# Patient Record
Sex: FEMALE | Race: WHITE
[De-identification: names, ages, dates, MRNs, and addresses within clinical notes are randomized per-mention and may not be internally consistent; named-entity substitution may affect disease eponyms.]

---

## 2017-04-21 ENCOUNTER — HOSPITAL ENCOUNTER (OUTPATIENT)
Dept: HOSPITAL 17 - NEPC | Age: 77
Setting detail: OBSERVATION
LOS: 1 days | Discharge: HOME | End: 2017-04-22
Attending: HOSPITALIST | Admitting: HOSPITALIST
Payer: COMMERCIAL

## 2017-04-21 VITALS — WEIGHT: 158.73 LBS | HEIGHT: 65 IN | BODY MASS INDEX: 26.45 KG/M2

## 2017-04-21 VITALS
SYSTOLIC BLOOD PRESSURE: 113 MMHG | TEMPERATURE: 99.9 F | DIASTOLIC BLOOD PRESSURE: 57 MMHG | HEART RATE: 114 BPM | OXYGEN SATURATION: 100 % | RESPIRATION RATE: 16 BRPM

## 2017-04-21 VITALS — TEMPERATURE: 99.5 F | DIASTOLIC BLOOD PRESSURE: 64 MMHG | SYSTOLIC BLOOD PRESSURE: 110 MMHG

## 2017-04-21 VITALS
RESPIRATION RATE: 16 BRPM | HEART RATE: 119 BPM | TEMPERATURE: 99.5 F | DIASTOLIC BLOOD PRESSURE: 56 MMHG | OXYGEN SATURATION: 100 % | SYSTOLIC BLOOD PRESSURE: 110 MMHG

## 2017-04-21 VITALS
RESPIRATION RATE: 16 BRPM | SYSTOLIC BLOOD PRESSURE: 113 MMHG | TEMPERATURE: 98.2 F | OXYGEN SATURATION: 99 % | HEART RATE: 117 BPM | DIASTOLIC BLOOD PRESSURE: 68 MMHG

## 2017-04-21 VITALS
OXYGEN SATURATION: 99 % | DIASTOLIC BLOOD PRESSURE: 67 MMHG | HEART RATE: 106 BPM | SYSTOLIC BLOOD PRESSURE: 146 MMHG | TEMPERATURE: 99.4 F | RESPIRATION RATE: 16 BRPM

## 2017-04-21 VITALS — OXYGEN SATURATION: 97 %

## 2017-04-21 DIAGNOSIS — T45.1X5A: ICD-10-CM

## 2017-04-21 DIAGNOSIS — E78.00: ICD-10-CM

## 2017-04-21 DIAGNOSIS — I10: ICD-10-CM

## 2017-04-21 DIAGNOSIS — D64.9: ICD-10-CM

## 2017-04-21 DIAGNOSIS — E78.5: ICD-10-CM

## 2017-04-21 DIAGNOSIS — R00.0: ICD-10-CM

## 2017-04-21 DIAGNOSIS — D72.829: Primary | ICD-10-CM

## 2017-04-21 DIAGNOSIS — T38.0X5A: ICD-10-CM

## 2017-04-21 DIAGNOSIS — C56.9: ICD-10-CM

## 2017-04-21 DIAGNOSIS — Z90.710: ICD-10-CM

## 2017-04-21 DIAGNOSIS — D69.6: ICD-10-CM

## 2017-04-21 DIAGNOSIS — Z79.899: ICD-10-CM

## 2017-04-21 LAB
ANION GAP SERPL CALC-SCNC: 9 MEQ/L (ref 5–15)
APTT BLD: 26.6 SEC (ref 24.3–30.1)
BACTERIA #/AREA URNS HPF: (no result) /HPF
BASOPHILS # BLD AUTO: 0 TH/MM3 (ref 0–0.2)
BASOPHILS NFR BLD: 0.1 % (ref 0–2)
BUN SERPL-MCNC: 19 MG/DL (ref 7–18)
CHLORIDE SERPL-SCNC: 102 MEQ/L (ref 98–107)
CK SERPL-CCNC: 60 U/L (ref 26–192)
COLOR UR: (no result)
COMMENT (UR): (no result)
CULTURE IF INDICATED: (no result)
DACRYOCYTES BLD QL SMEAR: (no result)
EOSINOPHIL # BLD: 0 TH/MM3 (ref 0–0.4)
EOSINOPHIL NFR BLD: 0 % (ref 0–4)
ERYTHROCYTE [DISTWIDTH] IN BLOOD BY AUTOMATED COUNT: 18.6 % (ref 11.6–17.2)
FERRITIN SERPL-MCNC: 438 NG/ML (ref 8–252)
GFR SERPLBLD BASED ON 1.73 SQ M-ARVRAT: 67 ML/MIN (ref 89–?)
GLUCOSE UR STRIP-MCNC: (no result) MG/DL
HCO3 BLD-SCNC: 26.4 MEQ/L (ref 21–32)
HCT VFR BLD CALC: 23.7 % (ref 35–46)
HEMO FLAGS: (no result)
HGB UR QL STRIP: (no result)
INR PPP: 1 RATIO
KETONES UR STRIP-MCNC: (no result) MG/DL
LYMPHOCYTES # BLD AUTO: 1.2 TH/MM3 (ref 1–4.8)
LYMPHOCYTES NFR BLD AUTO: 7.1 % (ref 9–44)
MCH RBC QN AUTO: 34.7 PG (ref 27–34)
MCHC RBC AUTO-ENTMCNC: 34.7 % (ref 32–36)
MCV RBC AUTO: 100 FL (ref 80–100)
MONOCYTES NFR BLD: 3 % (ref 0–8)
MUCOUS THREADS #/AREA URNS LPF: (no result) /LPF
NEUTROPHILS # BLD AUTO: 14.8 TH/MM3 (ref 1.8–7.7)
NEUTROPHILS NFR BLD AUTO: 89.8 % (ref 16–70)
NITRITE UR QL STRIP: (no result)
PLAT MORPH BLD: NORMAL
PLATELET # BLD: 40 TH/MM3 (ref 150–450)
PLATELET BLD QL SMEAR: (no result)
POTASSIUM SERPL-SCNC: 3.9 MEQ/L (ref 3.5–5.1)
PROTHROMBIN TIME: 11.2 SEC (ref 9.8–11.6)
RBC # BLD AUTO: 2.37 MIL/MM3 (ref 4–5.3)
SCAN/DIFF: (no result)
SODIUM SERPL-SCNC: 137 MEQ/L (ref 136–145)
SP GR UR STRIP: 1.01 (ref 1–1.03)
SQUAMOUS #/AREA URNS HPF: 1 /HPF (ref 0–5)
TRANSFERRIN IRON PROFILE: 201 MG/DL (ref 200–360)
WBC # BLD AUTO: 16.5 TH/MM3 (ref 4–11)

## 2017-04-21 PROCEDURE — 97161 PT EVAL LOW COMPLEX 20 MIN: CPT

## 2017-04-21 PROCEDURE — 85610 PROTHROMBIN TIME: CPT

## 2017-04-21 PROCEDURE — 85014 HEMATOCRIT: CPT

## 2017-04-21 PROCEDURE — 84443 ASSAY THYROID STIM HORMONE: CPT

## 2017-04-21 PROCEDURE — 36430 TRANSFUSION BLD/BLD COMPNT: CPT

## 2017-04-21 PROCEDURE — 83550 IRON BINDING TEST: CPT

## 2017-04-21 PROCEDURE — G0378 HOSPITAL OBSERVATION PER HR: HCPCS

## 2017-04-21 PROCEDURE — 82728 ASSAY OF FERRITIN: CPT

## 2017-04-21 PROCEDURE — 83540 ASSAY OF IRON: CPT

## 2017-04-21 PROCEDURE — 96360 HYDRATION IV INFUSION INIT: CPT

## 2017-04-21 PROCEDURE — P9035 PLATELET PHERES LEUKOREDUCED: HCPCS

## 2017-04-21 PROCEDURE — 85730 THROMBOPLASTIN TIME PARTIAL: CPT

## 2017-04-21 PROCEDURE — 80048 BASIC METABOLIC PNL TOTAL CA: CPT

## 2017-04-21 PROCEDURE — 87040 BLOOD CULTURE FOR BACTERIA: CPT

## 2017-04-21 PROCEDURE — 93005 ELECTROCARDIOGRAM TRACING: CPT

## 2017-04-21 PROCEDURE — 83605 ASSAY OF LACTIC ACID: CPT

## 2017-04-21 PROCEDURE — 86901 BLOOD TYPING SEROLOGIC RH(D): CPT

## 2017-04-21 PROCEDURE — 86900 BLOOD TYPING SEROLOGIC ABO: CPT

## 2017-04-21 PROCEDURE — 85007 BL SMEAR W/DIFF WBC COUNT: CPT

## 2017-04-21 PROCEDURE — 99285 EMERGENCY DEPT VISIT HI MDM: CPT

## 2017-04-21 PROCEDURE — 71010: CPT

## 2017-04-21 PROCEDURE — 84484 ASSAY OF TROPONIN QUANT: CPT

## 2017-04-21 PROCEDURE — P9016 RBC LEUKOCYTES REDUCED: HCPCS

## 2017-04-21 PROCEDURE — 85025 COMPLETE CBC W/AUTO DIFF WBC: CPT

## 2017-04-21 PROCEDURE — 85018 HEMOGLOBIN: CPT

## 2017-04-21 PROCEDURE — 85027 COMPLETE CBC AUTOMATED: CPT

## 2017-04-21 PROCEDURE — 86920 COMPATIBILITY TEST SPIN: CPT

## 2017-04-21 PROCEDURE — 86850 RBC ANTIBODY SCREEN: CPT

## 2017-04-21 PROCEDURE — 82550 ASSAY OF CK (CPK): CPT

## 2017-04-21 PROCEDURE — 83880 ASSAY OF NATRIURETIC PEPTIDE: CPT

## 2017-04-21 PROCEDURE — 81001 URINALYSIS AUTO W/SCOPE: CPT

## 2017-04-21 RX ADMIN — PHENYTOIN SODIUM SCH MLS/HR: 50 INJECTION INTRAMUSCULAR; INTRAVENOUS at 20:42

## 2017-04-21 RX ADMIN — Medication SCH ML: at 21:10

## 2017-04-21 NOTE — PD
HPI


Chief Complaint:  Abnormal Results


Time Seen by Provider:  14:14


Travel History


International Travel<30 days:  No


Contact w/Intl Traveler<30days:  No


Traveled to known affect area:  No





History of Present Illness


HPI


76-year-old female presents with general weakness and tachycardia from Dr. schultz office.  She states she feels worse when she moves around.  They told 

her she had a low hemoglobin and she was needing transfusion.  She states this 

was not available to be done as an outpatient and given her symptoms it was not 

thought that she could make it through the weekend without getting a 

transfusion so they advised her to come here.  She denies any active bleeding.  

She states she follows with Dr. schultz for ovarian cancer and is on her sixth 

round of chemotherapy.  She denies any fever or other concurrent complaints as 

well.  Quality is lightheaded.  Severity is moderate.  Her last chemotherapy 

was about a couple weeks ago





PFSH


Past Medical History


Cancer:  No


Cardiovascular Problems:  No


High Cholesterol:  Yes


Chemotherapy:  Yes (4/7/14)


Diabetes:  No


Endocrine:  No


Genitourinary:  No


Hepatitis:  No


Hiatal Hernia:  No


Hypertension:  Yes (NO MEDS)


Immune Disorder:  No


Musculoskeletal:  No


Neurologic:  No


Psychiatric:  No


Reproductive:  No


Respiratory:  No


Immunizations Current:  Yes


Thyroid Disease:  No





Past Surgical History


Abdominal Surgery:  Yes (APPENDECTOMY)


AICD:  No


Appendectomy:  Yes (60 YRS AGO)


Cardiac Surgery:  No


Ear Surgery:  No


Endocrine Surgery:  No


Eye Surgery:  No


Genitourinary Surgery:  No


Gynecologic Surgery:  No


Joint Replacement:  No


Oral Surgery:  No


Pacemaker:  No


Thoracic Surgery:  No


Tonsillectomy:  Yes (60 YEARS AGO)





Social History


Alcohol Use:  Yes (DRINKS DAILY)


Tobacco Use:  No


Substance Use:  No





Allergies-Medications


(Allergen,Severity, Reaction):  


Coded Allergies:  


     No Known Allergies (Verified , 11/10/16)


Reported Meds & Prescriptions





Reported Meds & Active Scripts


Active


Reported


Lysine (Lysine HCl) 500 Mg Tab   DAILY


Multi Vitamin (Multiple Vitamin) 1 Tab Tab 1 Tab PO DAILY


Atorvastatin (Atorvastatin Calcium) 20 Mg Tab 20 Mg PO HS


Losartan (Losartan Potassium) 100 Mg Tab 100 Mg PO DAILY








Review of Systems


Except as stated in HPI:  all other systems reviewed are Neg





Physical Exam


Narrative


GENERAL: Well-nourished, well-developed patient.


SKIN: Warm and dry.


HEAD: Normocephalic and atraumatic.


EYES: No injection or drainage. 


ENT: No nasal drainage noted. 


NECK: Supple, trachea midline.


CARDIOVASCULAR: Tachycardic rate and regular rhythm


RESPIRATORY: Breath sounds equal bilaterally at apices. No accessory muscle use.


GASTROINTESTINAL: Abdomen soft, non-tender, nondistended. 


EXTREMITIES: No edema.


NEUROLOGICAL: Awake and alert. Motor and sensory grossly within normal limits. 

Normal speech.





Data


Data


Last Documented VS





Vital Signs








  Date Time  Temp Pulse Resp B/P Pulse Ox O2 Delivery O2 Flow Rate FiO2


 


4/21/17 14:30     97   


 


4/21/17 14:07 98.2 117 16 113/68    








Orders





 Complete Blood Count With Diff (4/21/17 14:14)


Basic Metabolic Panel (Bmp) (4/21/17 14:14)


Act Partial Throm Time (Ptt) (4/21/17 14:14)


Prothrombin Time / Inr (Pt) (4/21/17 14:14)


Iv Access Insert/Monitor (4/21/17 14:14)


Ecg Monitoring (4/21/17 14:14)


Oximetry (4/21/17 14:14)


Type And Screen (4/21/17 14:14)


Urinalysis - C+S If Indicated (4/21/17 14:32)


Sodium Chlor 0.9% 1000 Ml Inj (Ns 1000 M (4/21/17 14:45)


Electrocardiogram (4/21/17 )


B-Type Natriuretic Peptide (4/21/17 14:37)


Lactic Acid Sepsis Protocol (4/21/17 16:06)


Blood Culture (4/21/17 16:06)


Chest, Single Ap (4/21/17 16:06)


Ckmb (Isoenzyme) Profile (4/21/17 14:26)


Troponin I (4/21/17 14:26)


Sodium Chlor 0.9% 1000 Ml Inj (Ns 1000 M (4/21/17 18:00)


Admit Order (Ed Use Only) (4/21/17 18:05)





Labs





 Laboratory Tests








Test 4/21/17 4/21/17





 14:26 16:20


 


White Blood Count 16.5 TH/MM3 


 


Red Blood Count 2.37 MIL/MM3 


 


Hemoglobin 8.2 GM/DL 


 


Hematocrit 23.7 % 


 


Mean Corpuscular Volume 100.0 FL 


 


Mean Corpuscular Hemoglobin 34.7 PG 


 


Mean Corpuscular Hemoglobin 34.7 % 





Concent  


 


Red Cell Distribution Width 18.6 % 


 


Platelet Count 40 TH/MM3 


 


Mean Platelet Volume 9.5 FL 


 


Neutrophils (%) (Auto) 89.8 % 


 


Lymphocytes (%) (Auto) 7.1 % 


 


Monocytes (%) (Auto) 3.0 % 


 


Eosinophils (%) (Auto) 0.0 % 


 


Basophils (%) (Auto) 0.1 % 


 


Neutrophils # (Auto) 14.8 TH/MM3 


 


Lymphocytes # (Auto) 1.2 TH/MM3 


 


Monocytes # (Auto) 0.5 TH/MM3 


 


Eosinophils # (Auto) 0.0 TH/MM3 


 


Basophils # (Auto) 0.0 TH/MM3 


 


CBC Comment AUTO DIFF  


 


Differential Comment AUTO DIFF 





 CONFIRMED 


 


Platelet Estimate LOW  


 


Platelet Morphology Comment NORMAL  


 


Tear Drop Cells 1+  


 


Prothrombin Time 11.2 SEC 


 


Prothromb Time International 1.0 RATIO 





Ratio  


 


Activated Partial 26.6 SEC 





Thromboplast Time  


 


Sodium Level 137 MEQ/L 


 


Potassium Level 3.9 MEQ/L 


 


Chloride Level 102 MEQ/L 


 


Carbon Dioxide Level 26.4 MEQ/L 


 


Anion Gap 9 MEQ/L 


 


Blood Urea Nitrogen 19 MG/DL 


 


Creatinine 0.82 MG/DL 


 


Estimat Glomerular Filtration 67 ML/MIN 





Rate  


 


Random Glucose 90 MG/DL 


 


Calcium Level 8.9 MG/DL 


 


Total Creatine Kinase 60 U/L 


 


Troponin I LESS THAN 0.02 





 NG/ML 


 


B-Type Natriuretic Peptide 48 PG/ML 


 


Blood Type A POSITIVE  


 


Antibody Screen NEGATIVE  


 


Urine Color  LIGHT-YELLOW 


 


Urine Turbidity  CLEAR 


 


Urine pH  6.0 


 


Urine Specific Gravity  1.007 


 


Urine Protein  NEG mg/dL


 


Urine Glucose (UA)  NEG mg/dL


 


Urine Ketones  TRACE mg/dL


 


Urine Occult Blood  NEG 


 


Urine Nitrite  NEG 


 


Urine Bilirubin  NEG 


 


Urine Urobilinogen  LESS THAN 2.0





  MG/DL


 


Urine Leukocyte Esterase  NEG 


 


Urine WBC  2 /hpf


 


Urine Squamous Epithelial  1 /hpf





Cells  


 


Urine Amorphous Sediment  RARE 


 


Urine Bacteria  RARE /hpf


 


Urine Mucus  FEW /lpf


 


Microscopic Urinalysis Comment  CULT NOT





  INDICATED


 


Lactic Acid Level  2.0 mmol/L











MDM


Medical Decision Making


Medical Screen Exam Complete:  Yes


Emergency Medical Condition:  Yes


Medical Record Reviewed:  Yes (past history confirmed)


Interpretation(s)


CBC & BMP Diagram


4/21/17 14:26











Last 24 hours Impressions








Chest X-Ray 4/21/17 1606 Signed





Impressions: 





 Service Date/Time:  Friday, April 21, 2017 16:39 - CONCLUSION:  No acute 





 cardiopulmonary process.     Adrian Baez MD 





EKG is sinus tachycardia at 1:15 without STEMI criteria


Differential Diagnosis


Anemia, renal failure, UTI, atypical cardiac


Narrative Course


Will check blood work and discuss with her specialist





After discussion with her specialist will add on chest x-ray, EKG and dose with 

IV fluids





Patient has sirs criteria without source of infection, patient denies any 

symptoms other than generalized weakness.  Cultures sent.  Patient will be 

monitored in the hospital overnight given age and past history and she agrees 

to plan of care.  Placed on vancomycin and cefepime while awaiting cultures 

given history of chemotherapy and age





Physician Communication


Physician Communication


dr schultz states that she was tachycardic to 130 in his office and he was 

concerned that there was something more going on with her in addition to her 

anemia and that she should be checked out here and likely admitted to the 

hospital for further care.


dr sanches agrees to admit





Diagnosis





 Primary Impression:  


 Anemia


 Qualified Code:  D64.9 - Anemia, unspecified type


 Additional Impressions:  


 Thrombocytopenia


 Leukocytosis


 Qualified Code:  D72.829 - Leukocytosis, unspecified type


 Tachycardia


 Weakness





Admitting Information


Admitting Physician Requests:  Observation








Gwendolyn Zhu MD Apr 21, 2017 14:42


Gwendolyn Zhu MD Apr 21, 2017 14:42

## 2017-04-21 NOTE — RADRPT
EXAM DATE/TIME:  04/21/2017 16:39 

 

[facility] COMPARISON:     

No previous studies available for comparison.

 

                     

INDICATIONS :     

Patient states shortness of breath.

                     

 

MEDICAL HISTORY :     

Hypertension.          

 

SURGICAL HISTORY :        

Infu-a-port

 

ENCOUNTER:     

Initial                                        

 

ACUITY:     

1 day      

 

PAIN SCORE:     

0/10

 

LOCATION:     

Bilateral chest 

 

FINDINGS:     

A single view of the chest demonstrates the lungs to be symmetrically aerated without evidence of mas
s, infiltrate or effusion.  The cardiomediastinal contours are unremarkable.  Bilateral breast augmen
tation with capsular calcification. Right IJ [id number] catheter with the tip projecting over the 
central venous system. Osseous structures are intact.

 

CONCLUSION:     

No acute cardiopulmonary process.

 

 

 

 Adrian Baez MD on April 21, 2017 at 17:13           

Board Certified Radiologist.

 This report was verified electronically.

## 2017-04-21 NOTE — HHI.HP
__________________________________________________





HPI


Service


[facility]


Primary Care Physician


Non-Staff


Admission Diagnosis


anemia


Diagnoses:  


(1) Symptomatic anemia


(2) Thrombocytopenia


(3) Tachycardia


(4) Leukocytosis


(5) Ovarian cancer


(6) Weakness


Chief Complaint:  


Fatigue


Dizziness


Generalized weakness


Travel History


International Travel<30 Days:  No


Contact w/Intl Traveler <30 Da:  No


Traveled to Known Affected Are:  No


History of Present Illness


This is a 76-year-old female with a past medical history of hypertension, 

dyslipidemia and ovarian cancer status post hysterectomy 11/10/16 just recently 

completing her sixth and final round of chemotherapy 2 weeks ago who presents 

to [facility] ED with complaints of generalized weakness, fatigue, dyspnea 

on exertion and dizziness with ambulation for the past week.  She denies any 

chest pain or palpitations.  She denies any shortness of breath at rest.  She 

denies any evidence of bleeding including no black/bloody/tarry stools or 

hematuria.  She reports a good appetite with no nausea or vomiting up until 

today when she did have some slight nausea only.  She is requesting to eat now.

  She was seen at Dr. Kiser's office earlier today was found to have 

tachycardia and was told that she had a low hemoglobin which was 8.2 and she 

come in to the hospital for possible transfusion.  Patient reports that she 

felt this way a few months ago and was discovered to have a hemoglobin around 7 

and was given a transfusion with improvement in her symptoms.  She denies any 

recent illness or ill contacts.  She denies any fever at home but does states 

that she's felt more chilled over the past few days.  She denies any cough, 

sore throat, runny nose or ear pain.  In the ED, she does not have a hemoglobin 

of 8.2 and hematocrit of 23.7.  She is also found to have leukocytosis with 

white count 16.5 and thrombocytopenia with a platelet count of 40.  UA was 

unremarkable.  Chest x-ray showed no acute cardiopulmonary process.





Review of Systems


Except as stated in HPI:  all other systems reviewed are Neg (10 point review 

of systems completed and all pertinents negative except as stated in the 

history of present illness)





Past Family Social History


Past Medical History


Hypertension


Dyslipidemia


Ovarian cancer status post hysterectomy and chemotherapy treatment 6


Past Surgical History


11/10/16 status post hysterectomy and bilateral salpingo-oophorectomy


Tonsillectomy


Appendectomy


Reported Medications


Lysine (Lysine HCl) 500 Mg Tab   DAILY


Multi Vitamin (Multiple Vitamin) 1 Tab Tab 1 Tab PO DAILY


Atorvastatin (Atorvastatin Calcium) 20 Mg Tab 20 Mg PO HS


Losartan (Losartan Potassium) 100 Mg Tab 100 Mg PO DAILY


Allergies:  


Coded Allergies:  


     No Known Allergies (Verified , 11/10/16)


Active Ordered Medications





 Current Medications








 Medications


  (Trade)  Dose


 Ordered  Sig/Orly


 Route  Start Time


 Stop Time Status Last Admin


 


  (NS 1000 ml Inj)  1,000 ml @ 


 999 mls/hr  BOLUS  ONCE


 IV  4/21/17 18:00


 4/21/17 19:00   


 








Family History


Both mother and father had tuberculosis


Social History


Patient denies any tobacco or illicit drug use.


She's not had any alcoholic beverages since November of last year.





Physical Exam


Vital Signs





 Vital Signs








  Date Time  Temp Pulse Resp B/P Pulse Ox O2 Delivery O2 Flow Rate FiO2


 


4/21/17 14:30     97   


 


4/21/17 14:07 98.2 117 16 113/68 99   








Physical Exam


GENERAL: This is a well-nourished, well-developed patient, in no apparent 

distress.  Awake and alert.  Daughters at the bedside.


SKIN: No rashes, ecchymoses or lesions. Cool and dry.


HEAD: Atraumatic. Normocephalic. No temporal or scalp tenderness.


EYES: Pupils equal round and reactive. Extraocular motions intact. No scleral 

icterus. No injection or drainage. 


ENT: Nose without bleeding, purulent drainage or septal hematoma. Throat 

without erythema, tonsillar hypertrophy or exudate. Uvula midline. Airway 

patent.  Slightly dry mucous membranes.


NECK: Trachea midline. No lymphadenopathy. Supple, nontender, no meningeal 

signs.


CARDIOVASCULAR: Tachycardic without murmurs, gallops, or rubs. 


RESPIRATORY: Clear to auscultation. Breath sounds equal bilaterally. No wheezes

, rales, or rhonchi.  


GASTROINTESTINAL: Abdomen soft, non-tender, nondistended. No hepato-splenomegaly

, or palpable masses. No guarding.


MUSCULOSKELETAL: Extremities without clubbing, cyanosis, or edema. No joint 

tenderness, effusion, or edema noted. No calf tenderness. 


NEUROLOGICAL: Awake and alert.  Able to move all 4 extremities.  No focal 

neurologic findings appreciated.  Normal speech.


Laboratory





Laboratory Tests








Test 4/21/17 4/21/17





 14:26 16:20


 


White Blood Count 16.5  


 


Red Blood Count 2.37  


 


Hemoglobin 8.2  


 


Hematocrit 23.7  


 


Mean Corpuscular Volume 100.0  


 


Mean Corpuscular Hemoglobin 34.7  


 


Mean Corpuscular Hemoglobin 34.7  





Concent  


 


Red Cell Distribution Width 18.6  


 


Platelet Count 40  


 


Mean Platelet Volume 9.5  


 


Neutrophils (%) (Auto) 89.8  


 


Lymphocytes (%) (Auto) 7.1  


 


Monocytes (%) (Auto) 3.0  


 


Eosinophils (%) (Auto) 0.0  


 


Basophils (%) (Auto) 0.1  


 


Neutrophils # (Auto) 14.8  


 


Lymphocytes # (Auto) 1.2  


 


Monocytes # (Auto) 0.5  


 


Eosinophils # (Auto) 0.0  


 


Basophils # (Auto) 0.0  


 


CBC Comment AUTO DIFF  


 


Differential Comment AUTO DIFF 





 CONFIRMED 


 


Platelet Estimate LOW  


 


Platelet Morphology Comment NORMAL  


 


Tear Drop Cells 1+  


 


Prothrombin Time 11.2  


 


Prothromb Time International 1.0  





Ratio  


 


Activated Partial 26.6  





Thromboplast Time  


 


Sodium Level 137  


 


Potassium Level 3.9  


 


Chloride Level 102  


 


Carbon Dioxide Level 26.4  


 


Anion Gap 9  


 


Blood Urea Nitrogen 19  


 


Creatinine 0.82  


 


Estimat Glomerular Filtration 67  





Rate  


 


Random Glucose 90  


 


Calcium Level 8.9  


 


Total Creatine Kinase 60  


 


Troponin I LESS THAN 0.02  


 


B-Type Natriuretic Peptide 48  


 


Blood Type A POSITIVE  


 


Antibody Screen NEGATIVE  


 


Urine Color  LIGHT-YELLOW 


 


Urine Turbidity  CLEAR 


 


Urine pH  6.0 


 


Urine Specific Gravity  1.007 


 


Urine Protein  NEG 


 


Urine Glucose (UA)  NEG 


 


Urine Ketones  TRACE 


 


Urine Occult Blood  NEG 


 


Urine Nitrite  NEG 


 


Urine Bilirubin  NEG 


 


Urine Urobilinogen  LESS THAN 2.0 


 


Urine Leukocyte Esterase  NEG 


 


Urine WBC  2 


 


Urine Squamous Epithelial  1 





Cells  


 


Urine Amorphous Sediment  RARE 


 


Urine Bacteria  RARE 


 


Urine Mucus  FEW 


 


Microscopic Urinalysis Comment  CULT NOT





  INDICATED


 


Lactic Acid Level  2.0 














 Date/Time Procedure Status





Source Growth 


 


 4/21/17 16:10 Aerobic Blood Culture Received





Blood Peripheral Pending 





 4/21/17 16:10 Anaerobic Blood Culture Received





Blood Peripheral Pending 








Result Diagram:  


4/21/17 1426                                                                   

             4/21/17 1426





Imaging





Last Impressions








Chest X-Ray 4/21/17 1606 Signed





Impressions: 





 Service Date/Time:  Friday, April 21, 2017 16:39 - CONCLUSION:  No acute 





 cardiopulmonary process.     Adrian Baez MD 











Assessment and Plan


Problem List:  


(1) Symptomatic anemia


ICD Code:  D64.9


Status:  Acute


(2) Tachycardia


ICD Code:  R00.0


Status:  Acute


(3) Leukocytosis


ICD Code:  D72.829


Status:  Acute


(4) Ovarian cancer


ICD Code:  C56.9


Status:  Acute


Assessment and Plan


76-year-old female with a past medical history of hypertension, dyslipidemia 

and ovarian cancer status post hysterectomy 11/10/16 just recently completing 

her sixth and final round of chemotherapy 2 weeks ago who presents to [facility] ED with complaints of generalized weakness, fatigue, dyspnea on exertion 

and dizziness with ambulation for the past week.





Symptomatic anemia in patient with history of ovarian cancer undergoing 

chemotherapy treatment


   - Admit to observation


   - Transfuse 1 unit of PRBCs


   - IVF


   - fall precautions


   - PT eval/tx


   - CBC in am





Tachycardia


   - Secondary to above


   - Lopressor 25 mg with parameters


   - Monitor on cardiac telemetry


   - Obtain TSH level





Leukocytosis


   - No evidence of sepsis.  UA and CXR unremarkable.  Patient is afebrile.


   - Patient received one time dose of antibiotics in the ED


   - Likely secondary to steroids use with chemotherapy


   - A.m. labs to monitor





Thrombocytopenia


   - secondary to chemotherapy treatment


   - monitor





Hypertension


   - Well-controlled, /68


   - Resume home antihypertensive


   - Monitor BP





Dyslipidemia


   - Resume home statin





DVT prophylaxis


- SCD/JIM hose





Written by Jennifer Chauhan PA-C acting as scribe for Dr. Agudelo on 4/21/17 at 

19:01. 


This note was transcribed by scribe Jennifer Chauhan.  I, Dr. Daniel Agudelo 

personally performed the history, physical exam, and medical decision making; 

and confirmed the accuracy of the information in the transcribed note.





Authenticated by Dr. Daniel Agudelo on 4/21/17 at 19:01.


Code Status


Full code


Discussed Condition With


Patient, daughter, ED physician





Problem Qualifiers





(1) Leukocytosis:  


Qualified Code:  D72.829 - Leukocytosis, unspecified type





Jennifer Chauhan Apr 21, 2017 19:06


Daniel Agudelo MD Apr 21, 2017 19:35

## 2017-04-22 VITALS
RESPIRATION RATE: 16 BRPM | SYSTOLIC BLOOD PRESSURE: 134 MMHG | HEART RATE: 92 BPM | OXYGEN SATURATION: 96 % | TEMPERATURE: 98.7 F | DIASTOLIC BLOOD PRESSURE: 77 MMHG

## 2017-04-22 VITALS
TEMPERATURE: 97.3 F | RESPIRATION RATE: 16 BRPM | DIASTOLIC BLOOD PRESSURE: 76 MMHG | OXYGEN SATURATION: 99 % | HEART RATE: 94 BPM | SYSTOLIC BLOOD PRESSURE: 120 MMHG

## 2017-04-22 VITALS
TEMPERATURE: 98.6 F | RESPIRATION RATE: 16 BRPM | SYSTOLIC BLOOD PRESSURE: 138 MMHG | DIASTOLIC BLOOD PRESSURE: 71 MMHG | HEART RATE: 100 BPM | OXYGEN SATURATION: 96 %

## 2017-04-22 VITALS
RESPIRATION RATE: 20 BRPM | HEART RATE: 115 BPM | OXYGEN SATURATION: 97 % | SYSTOLIC BLOOD PRESSURE: 129 MMHG | DIASTOLIC BLOOD PRESSURE: 68 MMHG | TEMPERATURE: 99.1 F

## 2017-04-22 VITALS
OXYGEN SATURATION: 97 % | SYSTOLIC BLOOD PRESSURE: 128 MMHG | DIASTOLIC BLOOD PRESSURE: 72 MMHG | TEMPERATURE: 98.8 F | RESPIRATION RATE: 20 BRPM | HEART RATE: 110 BPM

## 2017-04-22 VITALS — HEART RATE: 114 BPM

## 2017-04-22 VITALS — OXYGEN SATURATION: 95 %

## 2017-04-22 LAB
ANION GAP SERPL CALC-SCNC: 7 MEQ/L (ref 5–15)
BASOPHILS # BLD AUTO: 0 TH/MM3 (ref 0–0.2)
BASOPHILS NFR BLD: 0.3 % (ref 0–2)
BUN SERPL-MCNC: 14 MG/DL (ref 7–18)
CHLORIDE SERPL-SCNC: 111 MEQ/L (ref 98–107)
EOSINOPHIL # BLD: 0 TH/MM3 (ref 0–0.4)
EOSINOPHIL NFR BLD: 0.3 % (ref 0–4)
ERYTHROCYTE [DISTWIDTH] IN BLOOD BY AUTOMATED COUNT: 22 % (ref 11.6–17.2)
GFR SERPLBLD BASED ON 1.73 SQ M-ARVRAT: 81 ML/MIN (ref 89–?)
HCO3 BLD-SCNC: 24.7 MEQ/L (ref 21–32)
HCT VFR BLD CALC: 23.9 % (ref 35–46)
HCT VFR BLD CALC: 25 % (ref 35–46)
HEMO FLAGS: (no result)
LYMPHOCYTES # BLD AUTO: 1.9 TH/MM3 (ref 1–4.8)
LYMPHOCYTES NFR BLD AUTO: 24.9 % (ref 9–44)
MCH RBC QN AUTO: 33.4 PG (ref 27–34)
MCHC RBC AUTO-ENTMCNC: 35.4 % (ref 32–36)
MCV RBC AUTO: 94.1 FL (ref 80–100)
MONOCYTES NFR BLD: 4.3 % (ref 0–8)
NEUTROPHILS # BLD AUTO: 5.5 TH/MM3 (ref 1.8–7.7)
NEUTROPHILS NFR BLD AUTO: 70.2 % (ref 16–70)
NEUTS BAND # BLD MANUAL: 5.7 TH/MM3 (ref 1.8–7.7)
NEUTS BAND NFR BLD: 22 % (ref 0–6)
NEUTS SEG NFR BLD MANUAL: 51 % (ref 16–70)
PLAT MORPH BLD: NORMAL
PLATELET # BLD: 29 TH/MM3 (ref 150–450)
PLATELET BLD QL SMEAR: (no result)
POTASSIUM SERPL-SCNC: 3.6 MEQ/L (ref 3.5–5.1)
RBC # BLD AUTO: 2.54 MIL/MM3 (ref 4–5.3)
SCAN/DIFF: (no result)
SODIUM SERPL-SCNC: 143 MEQ/L (ref 136–145)
WBC # BLD AUTO: 7.8 TH/MM3 (ref 4–11)
WBC DIFF SAMPLE: 100

## 2017-04-22 RX ADMIN — PHENYTOIN SODIUM SCH MLS/HR: 50 INJECTION INTRAMUSCULAR; INTRAVENOUS at 06:26

## 2017-04-22 RX ADMIN — Medication SCH ML: at 09:19

## 2017-04-22 NOTE — EKG
Date Performed: 04/21/2017       Time Performed: 14:55:40

 

PTAGE:      76 years

 

EKG:      SINUS TACHYCARDIA LOW QRS VOLTAGE IN EXTREMITY LEADS ABNORMAL RHYTHM ECG 

 

NO PREVIOUS TRACING            

 

DOCTOR:   Pato Lo  Interpretating Date/Time  04/22/2017 13:16:35

## 2017-04-22 NOTE — HHI.PR
Subjective


Remarks


"I'm not feeling lightheaded like I was yesterday."


Pt encountered sitting up in bed with 3 family members present.


Pt spoke of having chills and light headedness upon admission, but those 

symptoms are now reportedly resolved.


Pt does endorse numbness in her fingertips as well as some degree of fatigue.


Pt denied nausea, vomiting, diarrhea, black/tarry stools and abdominal pain/

discomfort.





Objective


Vitals





 Vital Signs








  Date Time  Temp Pulse Resp B/P Pulse Ox O2 Delivery O2 Flow Rate FiO2


 


4/22/17 07:23 98.6 100 16 138/71 96   


 


4/22/17 04:15 98.8 110 20 128/72 97   


 


4/22/17 01:35  114      


 


4/22/17 00:09 99.1 115 20 129/68 97   


 


4/22/17 00:00     95   


 


4/21/17 21:57 99.5 114 16 110/64 99   


 


4/21/17 20:52 99.5 119 16 110/56 100 Room Air  


 


4/21/17 20:37 99.9 114 16 113/57 100 Room Air  


 


4/21/17 19:33 99.4 106 16 146/67 99 Room Air  


 


4/21/17 14:30     97   


 


4/21/17 14:07 98.2 117 16 113/68 99   








Result Diagram:  


4/22/17 0533                                                                   

             4/22/17 0533





Other Results





Microbiology








 Date/Time Procedure Status





Source Growth 


 


 4/21/17 16:10 Aerobic Blood Culture Received





Blood Peripheral Pending 





 4/21/17 16:10 Anaerobic Blood Culture Received





Blood Peripheral Pending 


 


 4/21/17 16:10 Aerobic Blood Culture Received





Blood Peripheral Pending 





 4/21/17 16:10 Anaerobic Blood Culture Received





Blood Peripheral Pending 








Imaging





Last Impressions








Chest X-Ray 4/21/17 1606 Signed





Impressions: 





 Service Date/Time:  Friday, April 21, 2017 16:39 - CONCLUSION:  No acute 





 cardiopulmonary process.     Adrian Baez MD 








Objective Remarks


GENERAL: 


SKIN: Warm and dry. Pt appeared slightly pale.


HEAD: Normocephalic.


EYES: No scleral icterus. No injection or drainage. 


NECK: Supple, trachea midline. No JVD or lymphadenopathy.


CARDIOVASCULAR: Regular rate and rhythm without murmurs, gallops, or rubs. 


RESPIRATORY: Breath sounds equal bilaterally. No accessory muscle use.


GASTROINTESTINAL: Abdomen soft, non-tender, nondistended. Guarding not 

evidenced.


MUSCULOSKELETAL: No cyanosis, or edema. Muscle strength was good 5/5.


BACK: Nontender without obvious deformity.





Procedures


No procedures performed within the past 24 hours.


Medications and IVs





 Current Medications








 Medications


  (Trade)  Dose


 Ordered  Sig/Orly


 Route  Start Time


 Stop Time Status Last Admin


 


  (NS 1000 ml Inj)  1,000 ml @ 


 84 mls/hr  [id number]


 IV  4/21/17 18:31


    4/21/17 20:42


 


 


  (NS Flush)  2 ml  UNSCH  PRN


 IV FLUSH  4/21/17 18:45


     


 


 


  (NS Flush)  2 ml  BID


 IV FLUSH  4/21/17 21:00


    4/22/17 09:19


 


 


  (Tylenol)  650 mg  Q4H  PRN


 PO  4/21/17 18:45


     


 


 


  (Zofran Inj)  4 mg  Q6H  PRN


 IVP  4/21/17 18:45


     


 


 


  (Milk Of


 Magnesia Liq)  30 ml  Q12H  PRN


 PO  4/21/17 18:45


     


 


 


  (Narcan Inj)  0.4 mg  UNSCH  PRN


 IV  4/21/17 18:45


     


 


 


 Metoprolol


 Tartrate 25 mg  25 mg  Q8HR  PRN


 PO  4/21/17 18:45


     


 


 


  ( ml Inj)  250 ml @ 


 15 mls/hr  ONCE  ONCE


 IV  4/21/17 18:45


 4/22/17 11:24  4/21/17 20:41


 


 


  (Lipitor)  20 mg  HS


 PO  4/21/17 21:00


     


 


 


  (Cozaar)  100 mg  DAILY


 PO  4/22/17 09:00


    4/22/17 09:19


 


 


 Multivitamins 1


 tab  1 tab  DAILY


 PO  4/22/17 09:00


    4/22/17 09:19


 


 


  ( ml Inj)  250 ml @ 


 15 mls/hr  ONCE  ONCE


 IV  4/22/17 09:30


 4/23/17 02:09   


 


 


  (Tylenol)  650 mg  Q4H  PRN


 PO  4/22/17 09:30


 4/22/17 13:31   


 


 


  (Benadryl)  25 mg  Q4H  PRN


 PO  4/22/17 09:30


 4/22/17 13:31   


 








Urinary Catheter:  No


Vascular Central Line Catheter:  No





A/P


Problem List:  


(1) Symptomatic anemia


ICD Code:  D64.9


Status:  Acute


(2) Tachycardia


ICD Code:  R00.0


Status:  Acute


(3) Leukocytosis


ICD Code:  D72.829


Status:  Acute


(4) Ovarian cancer


ICD Code:  C56.9


Status:  Acute


Assessment and Plan


Symptomatic anemia


One unit of platelets has been ordered.


After speaking with pt, Dr. Delgado to order an additional unit of blood.


Pt to have Tylenol and Benadryl in advance of transfusion; pt reported having 2 

units of "blood" given to her one month ago without incident


H&H to be obtained 4 hours after completion of transfusion; serial H&H ordered 

q 8 hours.


Should pt respond favorably, Dr. Delgado said he intends to discharge pt home 

later in the day.





Tachycardia


Pulse rate has decreased from 115BPM to 100 BPM


Pt has received fluids and blood product over the past 24 hours which may have 

led to decreased tachycardia.


Continue to monitor





Leukocytosis


WBC's have decreased from 16.5 to 7.8


Condition seemingly resolved.











Ovarian Cancer


Oncology to be contacted and informed pt was seen and transfused and will need 

an appointment this week.


Service was called and on-call physician was paged.Page was returned by on call 

oncologist Dr. Whatley  and case discussed with him. Dr. Whatley was informed Dr. Delgado


would like pt to be seen by Dr. Kiser this coming week. Dr. Whatley said he 

would pass along the information and try and accommodate the request.





Written by Tera Lynn, acting as scribe for Dr. Delgado on 4/22/17 at 10:

32. 





This note was transcribed by scribe [Tera Lynn].  I, Dr. Jeff Delgado 

personally performed the history, physical exam, and medical decision making; 

and confirmed the accuracy of the information in the transcribed note.





Authenticated by Dr. Jeff Delgado on 4/22/17 at 23:53.





Problem Qualifiers





(1) Leukocytosis:  


Qualified Code:  D72.829 - Leukocytosis, unspecified type





Tera Lynn Jr. Apr 22, 2017 10:32


Jeff Delgado MD Apr 22, 2017 23:53

## 2017-04-22 NOTE — HHI.DCPOC
Discharge Care Plan


Diagnosis:  


(1) Leukocytosis


(2) Ovarian cancer


(3) Symptomatic anemia


Your Health Problems Are:     Bleeding Tendency


Goals to Promote Your Health


* To prevent worsening of your condition and complications


* To maintain your health at the optimal level


Directions to Meet Your Goals


*** Take your medications as prescribed


*** Follow your dietary instruction


*** Follow activity as directed








*** Keep your appointments as scheduled


*** Take your immunizations and boosters as scheduled


*** If your symptoms worsen call your PCP, if no PCP go to Urgent Care Center 

or Emergency Room***


*** Smoking is Dangerous to Your Health. Avoid second hand smoke***


***Call the 24-hour hour crisis hotline for domestic abuse at [phone]***








Tera Lynn Jr. PA Apr 22, 2017 17:07

## 2020-06-30 ENCOUNTER — APPOINTMENT (RX ONLY)
Dept: URBAN - METROPOLITAN AREA CLINIC 52 | Facility: CLINIC | Age: 80
Setting detail: DERMATOLOGY
End: 2020-06-30

## 2020-06-30 DIAGNOSIS — L259 CONTACT DERMATITIS AND OTHER ECZEMA, UNSPECIFIED CAUSE: ICD-10-CM

## 2020-06-30 DIAGNOSIS — D485 NEOPLASM OF UNCERTAIN BEHAVIOR OF SKIN: ICD-10-CM

## 2020-06-30 PROBLEM — D48.5 NEOPLASM OF UNCERTAIN BEHAVIOR OF SKIN: Status: ACTIVE | Noted: 2020-06-30

## 2020-06-30 PROBLEM — L30.9 DERMATITIS, UNSPECIFIED: Status: ACTIVE | Noted: 2020-06-30

## 2020-06-30 PROCEDURE — ? COUNSELING

## 2020-06-30 PROCEDURE — ? BIOPSY BY SHAVE METHOD

## 2020-06-30 PROCEDURE — ? RECOMMENDATIONS

## 2020-06-30 PROCEDURE — 99201: CPT | Mod: 25

## 2020-06-30 PROCEDURE — 11102 TANGNTL BX SKIN SINGLE LES: CPT

## 2020-06-30 PROCEDURE — ? FULL BODY SKIN EXAM - DECLINED

## 2020-06-30 PROCEDURE — ? ADDITIONAL NOTES

## 2020-06-30 PROCEDURE — ? PRESCRIPTION

## 2020-06-30 RX ORDER — NYSTATIN AND TRIAMCINOLONE ACETONIDE 100000; 1 [USP'U]/G; MG/G
1 CREAM TOPICAL BID
Qty: 1 | Refills: 2 | Status: ERX | COMMUNITY
Start: 2020-06-30

## 2020-06-30 RX ADMIN — NYSTATIN AND TRIAMCINOLONE ACETONIDE 1: 100000; 1 CREAM TOPICAL at 00:00

## 2020-06-30 ASSESSMENT — LOCATION DETAILED DESCRIPTION DERM
LOCATION DETAILED: LEFT DISTAL PRETIBIAL REGION
LOCATION DETAILED: LEFT PROXIMAL PRETIBIAL REGION
LOCATION DETAILED: NASAL DORSUM

## 2020-06-30 ASSESSMENT — LOCATION SIMPLE DESCRIPTION DERM
LOCATION SIMPLE: NOSE
LOCATION SIMPLE: LEFT PRETIBIAL REGION

## 2020-06-30 ASSESSMENT — LOCATION ZONE DERM
LOCATION ZONE: LEG
LOCATION ZONE: NOSE

## 2020-06-30 NOTE — PROCEDURE: RECOMMENDATIONS
Recommendation Preamble: The following recommendations were made during the visit:
Recommendations (Free Text): Hypoallergenic products
Detail Level: Zone

## 2020-06-30 NOTE — PROCEDURE: MIPS QUALITY
Quality 226: Preventive Care And Screening: Tobacco Use: Screening And Cessation Intervention: Patient screened for tobacco use and is an ex/non-smoker
Quality 111:Pneumonia Vaccination Status For Older Adults: Pneumococcal Vaccination not Administered or Previously Received, Reason not Otherwise Specified
Detail Level: Detailed
Quality 130: Documentation Of Current Medications In The Medical Record: Current Medications Documented
Quality 431: Preventive Care And Screening: Unhealthy Alcohol Use - Screening: Patient screened for unhealthy alcohol use using a single question and scores less than 2 times per year
Quality 110: Preventive Care And Screening: Influenza Immunization: Influenza Immunization Administered during Influenza season
Quality 47: Advance Care Plan: Advance Care Planning discussed and documented; advance care plan or surrogate decision maker documented in the medical record.

## 2020-07-28 ENCOUNTER — APPOINTMENT (RX ONLY)
Dept: URBAN - METROPOLITAN AREA CLINIC 52 | Facility: CLINIC | Age: 80
Setting detail: DERMATOLOGY
End: 2020-07-28

## 2020-07-28 DIAGNOSIS — D22 MELANOCYTIC NEVI: ICD-10-CM

## 2020-07-28 DIAGNOSIS — D18.0 HEMANGIOMA: ICD-10-CM

## 2020-07-28 DIAGNOSIS — Z85.828 PERSONAL HISTORY OF OTHER MALIGNANT NEOPLASM OF SKIN: ICD-10-CM

## 2020-07-28 DIAGNOSIS — L82.1 OTHER SEBORRHEIC KERATOSIS: ICD-10-CM

## 2020-07-28 DIAGNOSIS — L81.4 OTHER MELANIN HYPERPIGMENTATION: ICD-10-CM

## 2020-07-28 PROBLEM — C44.311 BASAL CELL CARCINOMA OF SKIN OF NOSE: Status: ACTIVE | Noted: 2020-07-28

## 2020-07-28 PROBLEM — D18.01 HEMANGIOMA OF SKIN AND SUBCUTANEOUS TISSUE: Status: ACTIVE | Noted: 2020-07-28

## 2020-07-28 PROBLEM — D22.5 MELANOCYTIC NEVI OF TRUNK: Status: ACTIVE | Noted: 2020-07-28

## 2020-07-28 PROCEDURE — 99213 OFFICE O/P EST LOW 20 MIN: CPT

## 2020-07-28 PROCEDURE — ? COUNSELING

## 2020-07-28 PROCEDURE — ? ADDITIONAL NOTES

## 2020-07-28 PROCEDURE — ? FULL BODY SKIN EXAM

## 2020-07-28 PROCEDURE — ? DEFER

## 2020-07-28 ASSESSMENT — LOCATION ZONE DERM: LOCATION ZONE: TRUNK

## 2020-07-28 ASSESSMENT — LOCATION SIMPLE DESCRIPTION DERM
LOCATION SIMPLE: ABDOMEN
LOCATION SIMPLE: CHEST

## 2020-07-28 ASSESSMENT — LOCATION DETAILED DESCRIPTION DERM
LOCATION DETAILED: SUBXIPHOID
LOCATION DETAILED: EPIGASTRIC SKIN
LOCATION DETAILED: LOWER STERNUM

## 2020-07-28 NOTE — PROCEDURE: MIPS QUALITY
Quality 431: Preventive Care And Screening: Unhealthy Alcohol Use - Screening: Patient screened for unhealthy alcohol use using a single question and scores less than 2 times per year
Quality 47: Advance Care Plan: Advance Care Planning discussed and documented; advance care plan or surrogate decision maker documented in the medical record.
Quality 110: Preventive Care And Screening: Influenza Immunization: Influenza Immunization Administered during Influenza season
Quality 226: Preventive Care And Screening: Tobacco Use: Screening And Cessation Intervention: Patient screened for tobacco use and is an ex/non-smoker
Quality 111:Pneumonia Vaccination Status For Older Adults: Pneumococcal Vaccination not Administered or Previously Received, Reason not Otherwise Specified
Quality 130: Documentation Of Current Medications In The Medical Record: Current Medications Documented
Detail Level: Detailed

## 2020-08-20 ENCOUNTER — APPOINTMENT (RX ONLY)
Dept: URBAN - METROPOLITAN AREA CLINIC 61 | Facility: CLINIC | Age: 80
Setting detail: DERMATOLOGY
End: 2020-08-20

## 2020-08-20 PROBLEM — C44.311 BASAL CELL CARCINOMA OF SKIN OF NOSE: Status: ACTIVE | Noted: 2020-08-20

## 2020-08-20 PROCEDURE — 14060 TIS TRNFR E/N/E/L 10 SQ CM/<: CPT

## 2020-08-20 PROCEDURE — ? PRESCRIPTION

## 2020-08-20 PROCEDURE — 17311 MOHS 1 STAGE H/N/HF/G: CPT

## 2020-08-20 PROCEDURE — ? MOHS SURGERY

## 2020-08-20 PROCEDURE — ? REPAIR NOTE

## 2020-08-20 RX ORDER — DOXYCYCLINE HYCLATE 100 MG/1
CAPSULE, GELATIN COATED ORAL QD
Qty: 7 | Refills: 0 | Status: ERX | COMMUNITY
Start: 2020-08-20

## 2020-08-20 RX ADMIN — DOXYCYCLINE HYCLATE: 100 CAPSULE, GELATIN COATED ORAL at 00:00

## 2020-08-20 NOTE — PROCEDURE: MOHS SURGERY
Body Location Override (Optional - Billing Will Still Be Based On Selected Body Map Location If Applicable): nasal Dorsum

## 2020-08-20 NOTE — PROCEDURE: MOHS SURGERY

## 2020-10-27 ENCOUNTER — APPOINTMENT (RX ONLY)
Dept: URBAN - METROPOLITAN AREA CLINIC 61 | Facility: CLINIC | Age: 80
Setting detail: DERMATOLOGY
End: 2020-10-27

## 2020-10-27 VITALS — TEMPERATURE: 97.6 F

## 2020-10-27 DIAGNOSIS — D18.0 HEMANGIOMA: ICD-10-CM

## 2020-10-27 DIAGNOSIS — L81.4 OTHER MELANIN HYPERPIGMENTATION: ICD-10-CM

## 2020-10-27 DIAGNOSIS — Z85.828 PERSONAL HISTORY OF OTHER MALIGNANT NEOPLASM OF SKIN: ICD-10-CM

## 2020-10-27 DIAGNOSIS — D22 MELANOCYTIC NEVI: ICD-10-CM

## 2020-10-27 DIAGNOSIS — D485 NEOPLASM OF UNCERTAIN BEHAVIOR OF SKIN: ICD-10-CM

## 2020-10-27 DIAGNOSIS — L82.1 OTHER SEBORRHEIC KERATOSIS: ICD-10-CM

## 2020-10-27 PROBLEM — D18.01 HEMANGIOMA OF SKIN AND SUBCUTANEOUS TISSUE: Status: ACTIVE | Noted: 2020-10-27

## 2020-10-27 PROBLEM — D22.5 MELANOCYTIC NEVI OF TRUNK: Status: ACTIVE | Noted: 2020-10-27

## 2020-10-27 PROBLEM — D48.5 NEOPLASM OF UNCERTAIN BEHAVIOR OF SKIN: Status: ACTIVE | Noted: 2020-10-27

## 2020-10-27 PROCEDURE — 11102 TANGNTL BX SKIN SINGLE LES: CPT | Mod: 79

## 2020-10-27 PROCEDURE — ? ADDITIONAL NOTES

## 2020-10-27 PROCEDURE — ? BIOPSY BY SHAVE METHOD

## 2020-10-27 PROCEDURE — 99213 OFFICE O/P EST LOW 20 MIN: CPT | Mod: 24,25

## 2020-10-27 PROCEDURE — ? COUNSELING

## 2020-10-27 ASSESSMENT — LOCATION SIMPLE DESCRIPTION DERM
LOCATION SIMPLE: ABDOMEN
LOCATION SIMPLE: CHEST
LOCATION SIMPLE: RIGHT CHEEK
LOCATION SIMPLE: NOSE
LOCATION SIMPLE: LEFT UPPER BACK

## 2020-10-27 ASSESSMENT — LOCATION DETAILED DESCRIPTION DERM
LOCATION DETAILED: LEFT LATERAL UPPER BACK
LOCATION DETAILED: NASAL DORSUM
LOCATION DETAILED: EPIGASTRIC SKIN
LOCATION DETAILED: RIGHT INFERIOR CENTRAL MALAR CHEEK
LOCATION DETAILED: LOWER STERNUM
LOCATION DETAILED: SUBXIPHOID

## 2020-10-27 ASSESSMENT — LOCATION ZONE DERM
LOCATION ZONE: NOSE
LOCATION ZONE: FACE
LOCATION ZONE: TRUNK

## 2020-10-27 NOTE — PROCEDURE: COUNSELING
Detail Level: Simple
Detail Level: Generalized
Patient Specific Counseling (Will Not Stick From Patient To Patient): Can set up consultation for laser.

## 2020-11-04 ENCOUNTER — APPOINTMENT (RX ONLY)
Dept: URBAN - METROPOLITAN AREA CLINIC 52 | Facility: CLINIC | Age: 80
Setting detail: DERMATOLOGY
End: 2020-11-04

## 2020-11-04 PROBLEM — C44.519 BASAL CELL CARCINOMA OF SKIN OF OTHER PART OF TRUNK: Status: ACTIVE | Noted: 2020-11-04

## 2020-11-04 PROCEDURE — 11602 EXC TR-EXT MAL+MARG 1.1-2 CM: CPT | Mod: 79

## 2020-11-04 PROCEDURE — ? EXCISION

## 2020-11-04 PROCEDURE — 12032 INTMD RPR S/A/T/EXT 2.6-7.5: CPT | Mod: 79

## 2020-11-04 NOTE — PROCEDURE: EXCISION
no chest pain, no cough, and no shortness of breath. Complex Repair And Skin Substitute Graft Text: The defect edges were debeveled with a #15 scalpel blade.  The primary defect was closed partially with a complex linear closure.  Given the location of the remaining defect, shape of the defect and the proximity to free margins a skin substitute graft was deemed most appropriate to repair the remaining defect.  The graft was trimmed to fit the size of the remaining defect.  The graft was then placed in the primary defect, oriented appropriately, and sutured into place.

## 2020-11-18 ENCOUNTER — APPOINTMENT (RX ONLY)
Dept: URBAN - METROPOLITAN AREA CLINIC 52 | Facility: CLINIC | Age: 80
Setting detail: DERMATOLOGY
End: 2020-11-18

## 2020-11-18 VITALS — TEMPERATURE: 97.6 F

## 2020-11-18 DIAGNOSIS — Z48.02 ENCOUNTER FOR REMOVAL OF SUTURES: ICD-10-CM

## 2020-11-18 PROCEDURE — 99024 POSTOP FOLLOW-UP VISIT: CPT

## 2020-11-18 PROCEDURE — ? SUTURE REMOVAL (GLOBAL PERIOD)

## 2020-11-18 ASSESSMENT — LOCATION ZONE DERM: LOCATION ZONE: TRUNK

## 2020-11-18 ASSESSMENT — LOCATION SIMPLE DESCRIPTION DERM: LOCATION SIMPLE: LEFT UPPER BACK

## 2020-11-18 ASSESSMENT — LOCATION DETAILED DESCRIPTION DERM: LOCATION DETAILED: LEFT LATERAL UPPER BACK

## 2020-11-18 NOTE — PROCEDURE: SUTURE REMOVAL (GLOBAL PERIOD)
Detail Level: Detailed
Add 29661 Cpt? (Important Note: In 2017 The Use Of 56600 Is Being Tracked By Cms To Determine Future Global Period Reimbursement For Global Periods): yes

## 2022-11-09 NOTE — PROCEDURE: MOHS SURGERY
What Type Of Note Output Would You Prefer (Optional)?: Bullet Format How Severe Is Your Skin Lesion?: moderate Has Your Skin Lesion Been Treated?: not been treated Is This A New Presentation, Or A Follow-Up?: Skin Lesions Medical Necessity Statement: Based on Mohs AUC, Mohs surgery is the most appropriate treatment for this cancer compared to other treatments.

## 2023-02-27 NOTE — PROCEDURE: MOHS SURGERY
Resident Consent (Lip)/Introductory Paragraph: The rationale for Mohs was explained to the patient and consent was obtained. The risks, benefits and alternatives to therapy were discussed in detail. Specifically, the risks of lip deformity, changes in the oral aperture, infection, scarring, bleeding, prolonged wound healing, incomplete removal, allergy to anesthesia, nerve injury and recurrence were addressed. Prior to the procedure, the treatment site was clearly identified and confirmed by the patient. All components of Universal Protocol/PAUSE Rule completed.